# Patient Record
Sex: FEMALE | Race: BLACK OR AFRICAN AMERICAN | NOT HISPANIC OR LATINO | URBAN - METROPOLITAN AREA
[De-identification: names, ages, dates, MRNs, and addresses within clinical notes are randomized per-mention and may not be internally consistent; named-entity substitution may affect disease eponyms.]

---

## 2024-06-10 ENCOUNTER — OFFICE VISIT (OUTPATIENT)
Dept: URBAN - METROPOLITAN AREA CLINIC 118 | Facility: CLINIC | Age: 59
End: 2024-06-10
Payer: COMMERCIAL

## 2024-06-10 ENCOUNTER — DASHBOARD ENCOUNTERS (OUTPATIENT)
Age: 59
End: 2024-06-10

## 2024-06-10 VITALS
DIASTOLIC BLOOD PRESSURE: 86 MMHG | TEMPERATURE: 97 F | WEIGHT: 172.2 LBS | HEART RATE: 67 BPM | BODY MASS INDEX: 30.51 KG/M2 | HEIGHT: 63 IN | SYSTOLIC BLOOD PRESSURE: 139 MMHG

## 2024-06-10 DIAGNOSIS — R14.0 ABDOMINAL BLOATING: ICD-10-CM

## 2024-06-10 DIAGNOSIS — K57.30 DIVERTICULOSIS OF COLON WITHOUT DIVERTICULITIS: ICD-10-CM

## 2024-06-10 DIAGNOSIS — R10.32 LLQ ABDOMINAL PAIN: ICD-10-CM

## 2024-06-10 PROBLEM — 116289008: Status: ACTIVE | Noted: 2024-06-10

## 2024-06-10 PROBLEM — 301716002: Status: ACTIVE | Noted: 2024-06-10

## 2024-06-10 PROBLEM — 398311004: Status: ACTIVE | Noted: 2024-06-10

## 2024-06-10 PROCEDURE — 99204 OFFICE O/P NEW MOD 45 MIN: CPT | Performed by: INTERNAL MEDICINE

## 2024-06-10 NOTE — HPI-TODAY'S VISIT:
Patient is a 59 y/o F who presents with LLQ pain. She was experiencing LLQ pain at the time she made the appointment in April. Reports no pain today.  She reports having bloating and "lots of gas" x 6 months, associated with eating late at night. Relief with TUMs. Reports high stress x 6 months - issues with house and was living in a hotel. Denies blood with BM, melena, abdominal pain, rectal pain, N/V/D, constipation, changes in BM, unintentional wt loss.  Normal bowel habits: 2-3x/day. Last colonoscopy in 2023 with Dr. Carrero showed diverticulosis. Reports low fiber diet and does not drink enough water. Exercises 4-5x a week. Denies smoking and very occasional alcohol 3-4x/month.

## 2024-10-18 ENCOUNTER — LAB OUTSIDE AN ENCOUNTER (OUTPATIENT)
Dept: URBAN - METROPOLITAN AREA CLINIC 109 | Facility: CLINIC | Age: 59
End: 2024-10-18

## 2024-10-18 ENCOUNTER — OFFICE VISIT (OUTPATIENT)
Dept: URBAN - METROPOLITAN AREA CLINIC 109 | Facility: CLINIC | Age: 59
End: 2024-10-18
Payer: COMMERCIAL

## 2024-10-18 VITALS
TEMPERATURE: 96.8 F | DIASTOLIC BLOOD PRESSURE: 102 MMHG | BODY MASS INDEX: 29.8 KG/M2 | HEIGHT: 63 IN | HEART RATE: 71 BPM | WEIGHT: 168.2 LBS | SYSTOLIC BLOOD PRESSURE: 165 MMHG

## 2024-10-18 DIAGNOSIS — R10.32 LLQ ABDOMINAL PAIN: ICD-10-CM

## 2024-10-18 DIAGNOSIS — K57.30 DIVERTICULOSIS OF COLON WITHOUT DIVERTICULITIS: ICD-10-CM

## 2024-10-18 DIAGNOSIS — R14.0 ABDOMINAL BLOATING: ICD-10-CM

## 2024-10-18 DIAGNOSIS — I10 ESSENTIAL HYPERTENSION: ICD-10-CM

## 2024-10-18 PROBLEM — 59621000: Status: ACTIVE | Noted: 2024-10-18

## 2024-10-18 PROCEDURE — 99214 OFFICE O/P EST MOD 30 MIN: CPT | Performed by: INTERNAL MEDICINE

## 2024-10-18 RX ORDER — HYOSCYAMINE SULFATE 0.12 MG/1
1 TABLET UNDER THE TONGUE AND ALLOW TO DISSOLVE TABLET SUBLINGUAL
Qty: 90 | Refills: 1 | OUTPATIENT
Start: 2024-10-18 | End: 2024-12-16

## 2024-10-18 NOTE — PHYSICAL EXAM GASTROINTESTINAL
Abdomen: soft and nondistended, no visible masses, mild LLQ tenderness to palpation, no guarding or rigidity, no rebound tenderness, no palpable masses, normal bowel sounds. Liver and Spleen: no hepatosplenomegaly, liver nontender. Rectal: Deferred

## 2024-10-18 NOTE — HPI-TODAY'S VISIT:
10/18/24: Pt is a 59 yo female here for abd pain.  LLQ pain returned since last visit. Pain more frequent/constant and comes in sharp waves w/ no clear trigger. Constant bloating/gas.  Still w/o N/V/D/C, signs GI bleeding, or fever/chills.  Weight down ~4 lbs from last visit. Reports intentionally dieting.  Tried Restora probiotics w/o help.  Saw gyn and had pelvic US that was normal. Colon report obtained from [facility] from 11/2022. Findings of mod diverticulosis of sigmoid, IH, and 8mm TA polyp in ascending colon. Advised 5 year repeat.   - - - - - - - - - - - - 6/2024 (Dr. Ramon): Patient is a 57 y/o F who presents with LLQ pain. She was experiencing LLQ pain at the time she made the appointment in April. Reports no pain today.  She reports having bloating and "lots of gas" x 6 months, associated with eating late at night. Relief with TUMs. Reports high stress x 6 months - issues with house and was living in a hotel. Denies blood with BM, melena, abdominal pain, rectal pain, N/V/D, constipation, changes in BM, unintentional wt loss.  Normal bowel habits: 2-3x/day. Last colonoscopy in 2023 with Dr. Carrero showed diverticulosis. Reports low fiber diet and does not drink enough water. Exercises 4-5x a week. Denies smoking and very occasional alcohol 3-4x/month.

## 2024-12-02 ENCOUNTER — LAB OUTSIDE AN ENCOUNTER (OUTPATIENT)
Dept: URBAN - METROPOLITAN AREA CLINIC 109 | Facility: CLINIC | Age: 59
End: 2024-12-02

## 2024-12-02 ENCOUNTER — TELEPHONE ENCOUNTER (OUTPATIENT)
Dept: URBAN - METROPOLITAN AREA CLINIC 109 | Facility: CLINIC | Age: 59
End: 2024-12-02

## 2025-02-07 ENCOUNTER — OFFICE VISIT (OUTPATIENT)
Dept: URBAN - METROPOLITAN AREA CLINIC 118 | Facility: CLINIC | Age: 60
End: 2025-02-07